# Patient Record
(demographics unavailable — no encounter records)

---

## 2024-10-10 NOTE — ASSESSMENT
[FreeTextEntry1] : 24 y/o male with no significant PMHx presenting to establish care and for CPE.  Physical exam entirely normal  HCM: -Flu vaccine administered in house today -Fasting blood work as outpt -HIV / Hep C testing verbally consented

## 2024-10-10 NOTE — HISTORY OF PRESENT ILLNESS
[FreeTextEntry1] : Foot pain [de-identified] : 25 y/o male with no significant PMHx presenting for pain / mass on the left foot.

## 2024-10-10 NOTE — HEALTH RISK ASSESSMENT
[No] : No [Yes] : In the past 12 months have you used drugs other than those required for medical reasons? Yes [No falls in past year] : Patient reported no falls in the past year [0] : 2) Feeling down, depressed, or hopeless: Not at all (0) [PHQ-2 Negative - No further assessment needed] : PHQ-2 Negative - No further assessment needed [With Patient/Caregiver] : , with patient/caregiver [Reviewed updated] : Reviewed, updated [Aggressive treatment] : aggressive treatment [Never] : Never [Audit-CScore] : 0 [de-identified] : Marijuana sporadically [de-identified] : GYM,  [de-identified] : regular [[id number]] : 0 [AdvancecareDate] : 11/23

## 2024-12-21 NOTE — ASSESSMENT
[FreeTextEntry1] : Reviewed Previous xrays negative for any abnormalities I performed bedside MSK ultrasound utilizing 4Cable TVcan handheld ultrasound. The Lesion measures >1.3 x 0.6 cm along the medial plantar fascial band. It appears continuous with the plantar fascia.  Left foot MRI evaluate soft tissue mass We discussed treatments such as topical verapamil and injection therapy and possible surgical incision if symptomatic  PTR after MRI

## 2024-12-21 NOTE — HISTORY OF PRESENT ILLNESS
[FreeTextEntry1] : SHAAN  is a 24 year old male seen in the office for a mass in the bottom of left foot. Patient states he has this problem for over 4 years. He denies pain. He states he plays sports and it does not bother him. He was referred by PCP  States had xray 10/10/24 which was negative Is concerned about the lesion Denies any other constitutional symptoms

## 2024-12-21 NOTE — PHYSICAL EXAM
[General Appearance - Alert] : alert [General Appearance - In No Acute Distress] : in no acute distress [Ankle Swelling (On Exam)] : not present [Varicose Veins Of Lower Extremities] : not present [] : not present [Delayed in the Right Toes] : capillary refills normal in right toes [Delayed in the Left Toes] : capillary refills normal in the left toes [2+] : left foot dorsalis pedis 2+ [de-identified] : there is hard soft tissue mass along plantar medial fascial band at the level of the midfoot. There is no erythema or edema. It is superficial. There is no ulcerative lesions.  [Skin Color & Pigmentation] : normal skin color and pigmentation [Skin Lesions] : no skin lesions [Foot Ulcer] : no foot ulcer [Sensation] : the sensory exam was normal to light touch and pinprick [Oriented To Time, Place, And Person] : oriented to person, place, and time [Impaired Insight] : insight and judgment were intact

## 2025-01-18 NOTE — HISTORY OF PRESENT ILLNESS
[FreeTextEntry1] : SHAAN  is a 24 year old male seen in the office for a mass in the bottom of left foot. Patient states he has this problem for over 4 years. He denies pain. He states he plays sports and it does not bother him. States had xray 10/10/24 which was negative Is concerned about the lesion Denies any other constitutional symptoms  Patient had an MRI of the left foot and present to review it. No changes since last visit. Denies any pain to lesion

## 2025-01-18 NOTE — PHYSICAL EXAM
[General Appearance - Alert] : alert [General Appearance - In No Acute Distress] : in no acute distress [2+] : left foot dorsalis pedis 2+ [Skin Color & Pigmentation] : normal skin color and pigmentation [Skin Lesions] : no skin lesions [Sensation] : the sensory exam was normal to light touch and pinprick [Oriented To Time, Place, And Person] : oriented to person, place, and time [Impaired Insight] : insight and judgment were intact [Ankle Swelling (On Exam)] : not present [Varicose Veins Of Lower Extremities] : not present [] : not present [Delayed in the Right Toes] : capillary refills normal in right toes [Delayed in the Left Toes] : capillary refills normal in the left toes [de-identified] : there is hard soft tissue mass along plantar medial fascial band at the level of the midfoot. There is no erythema or edema. It is superficial. There is no ulcerative lesions.  There is no tenderness to palpation or reproducible tenderness to foot  [Foot Ulcer] : no foot ulcer

## 2025-01-18 NOTE — ASSESSMENT
[FreeTextEntry1] : Reviewed Previous xrays negative for any abnormalities Left foot MRI reviewed: Positive for plantar fascial fibroma. incident findings of peroneal tendonitis and stress reaction 2nd MTPJ (nonsymptomatic) Educated on condition  we discussed conservative treatments if bothering him such as topical verapimil anti-inflammatories injection therapy and orthotic offloading We can defer since nonpainful We discussed surgical treatment removal if painful risks/benefits will defer further treatment such not painful PTR as needed